# Patient Record
Sex: FEMALE | Race: BLACK OR AFRICAN AMERICAN | Employment: UNEMPLOYED | URBAN - METROPOLITAN AREA
[De-identification: names, ages, dates, MRNs, and addresses within clinical notes are randomized per-mention and may not be internally consistent; named-entity substitution may affect disease eponyms.]

---

## 2017-01-05 ENCOUNTER — HOSPITAL ENCOUNTER (OUTPATIENT)
Dept: LAB | Age: 52
Discharge: HOME | End: 2017-01-05

## 2017-01-05 ENCOUNTER — OFFICE VISIT (OUTPATIENT)
Dept: NEUROLOGY | Age: 52
End: 2017-01-05

## 2017-01-05 VITALS
OXYGEN SATURATION: 99 % | BODY MASS INDEX: 38.89 KG/M2 | DIASTOLIC BLOOD PRESSURE: 96 MMHG | SYSTOLIC BLOOD PRESSURE: 147 MMHG | TEMPERATURE: 98.1 F | HEART RATE: 105 BPM | HEIGHT: 64 IN | WEIGHT: 227.8 LBS

## 2017-01-05 DIAGNOSIS — S06.9XAA VERTIGO DUE TO BRAIN INJURY: ICD-10-CM

## 2017-01-05 DIAGNOSIS — R20.9 DISTURBANCE OF SKIN SENSATION: ICD-10-CM

## 2017-01-05 DIAGNOSIS — R42 VERTIGO DUE TO BRAIN INJURY: ICD-10-CM

## 2017-01-05 DIAGNOSIS — R41.3 MEMORY DISTURBANCE: ICD-10-CM

## 2017-01-05 DIAGNOSIS — F07.81 POST CONCUSSION SYNDROME: ICD-10-CM

## 2017-01-05 DIAGNOSIS — F07.81 POST CONCUSSION SYNDROME: Primary | ICD-10-CM

## 2017-01-05 PROCEDURE — 99001 SPECIMEN HANDLING PT-LAB: CPT

## 2017-01-05 RX ORDER — TOPIRAMATE 25 MG/1
TABLET ORAL
Qty: 90 TAB | Refills: 1 | Status: SHIPPED | OUTPATIENT
Start: 2017-01-05 | End: 2017-01-24 | Stop reason: SDUPTHER
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
## 2017-01-05 NOTE — MR AVS SNAPSHOT
Visit Information Date & Time Provider Department Dept. Phone Encounter #  
 1/5/2017  2:00 PM Sherri Vivas MD [address] [phone] [id number] Follow-up Instructions Return in about 1 month (around 2/5/2017). Your Appointments 2/28/2017  3:00 PM  
Follow Up with Sherri Vivas MD  
[address] [place] [address]) Appt Note: 1 month fu MRI Brain, Labs, EEG  
 [address] [address] [phone]  
  
   
 [facility] [address] Upcoming Health Maintenance Date Due DTaP/Tdap/Td series (1 - Tdap) 9/21/1985 INFLUENZA AGE 9 TO ADULT 8/1/2016 FOBT Q 1 YEAR AGE 50-75 9/5/2016 PAP AKA CERVICAL CYTOLOGY 9/1/2018 BREAST CANCER SCRN MAMMOGRAM 9/13/2018 Allergies as of 1/5/2017  Review Complete On: 1/5/2017 By: Sherri Vivas MD  
  
 Severity Noted Reaction Type Reactions Adhesive Medium 03/23/2015   Topical Rash Pcn [Penicillins]  01/04/2011    Swelling Current Immunizations  Never Reviewed No immunizations on file. Not reviewed this visit You Were Diagnosed With   
  
 Codes Comments Post concussion syndrome    -  Primary ICD-10-CM: F07.81 ICD-9-CM: 310.2 Memory disturbance     ICD-10-CM: R41.3 ICD-9-CM: 780.93 [facility]     ICD-10-CM: S06.9X0A, R42 
ICD-9-CM: 854.00, 780.4 Disturbance of skin sensation     ICD-10-CM: R20.9 ICD-9-CM: 349. 0 Vitals BP Pulse Temp Height(growth percentile) Weight(growth percentile) SpO2  
 (!) 147/96 (!) 105 98.1 °F (36.7 °C) (Oral) 5' 4\" (1.626 m) 227 lb 12.8 oz (103.3 kg) 99% BMI OB Status Smoking Status 39.1 kg/m2 Hysterectomy Never Smoker BMI and BSA Data Body Mass Index Body Surface Area  
 39.1 kg/m 2 2.16 m 2 Preferred Pharmacy Pharmacy Name Phone [facility] [phone] Your Updated Medication List  
  
   
This list is accurate as of: 17  3:06 PM.  Always use your most recent med list.  
  
  
  
  
 cyclobenzaprine 10 mg tablet Commonly known as:  FLEXERIL Take 1 Tab by mouth three (3) times daily as needed. Muscle spasms HYDROcodone-acetaminophen 5-325 mg per tablet Commonly known as:  [address] Take 1 Tab by mouth once over twenty-four (24) hours. As needed for severe pain  
  
 ibuprofen 800 mg tablet Commonly known as:  MOTRIN Take 1 Tab by mouth three (3) times daily as needed. Pain, headache  
  
 multivitamin tablet Commonly known as:  ONE A DAY Take 1 Tab by mouth daily. topiramate 25 mg tablet Commonly known as:  TOPAMAX  
1 at bedtime for 7 days then 2 at bedtime for 7 days then 1 q am and 2 qhs  
  
 VITAMIN B-12 1,000 mcg tablet Generic drug:  cyanocobalamin Take 1,000 mcg by mouth daily. Prescriptions Sent to Pharmacy Refills  
 topiramate (TOPAMAX) 25 mg tablet 1 Si at bedtime for 7 days then 2 at bedtime for 7 days then 1 q am and 2 qhs  
 Class: Normal  
 Pharmacy: [address] [place] [address] #: [phone] Follow-up Instructions Return in about 1 month (around 2017). To-Do List   
 2017 Neurology:  EEG AWAKE AND ASLEEP   
  
 2017 Imaging:  MRI BRAIN WO CONT   
  
 2017 Lab:  TSH 3RD GENERATION   
  
 2017   Lab:  VITAMIN B12   
  
 01/10/2017 4:00 PM  
  Appointment with Beto Pope PT at [facility] PT [address]  ([phone])  
  
 2017 4:30 PM  
  Appointment with Beto Pope PT at [facility] PT [address]  ([phone])  
  
 2017 3:30 PM  
  Appointment with Beto Pope PT at [facility] PT [address]  ([phone])  
  
 2017 3:30 PM  
 Appointment with Betsy Nava, PT at [facility] [address] ([phone])  
  
 01/30/2017 3:30 PM  
  Appointment with Betsy Nava, PT at [facility] [address] ([phone])  
  
 02/02/2017 3:30 PM  
  Appointment with Betsy Nava, PT at [facility] [address] ([phone]) Patient Instructions   
patitent to take vit c 500 mg twice a day if increased tingling with topamaax Introducing [facility]! Dear Olivia Morse: Thank you for requesting a [employer] account. Our records indicate that you already have an active [employer] account. You can access your account anytime at [email or web address] Did you know that you can access your hospital and ER discharge instructions at any time in [employer]? You can also review all of your test results from your hospital stay or ER visit. Additional Information If you have questions, please visit the Frequently Asked Questions section of the [employer] website at [email or web address] Remember, [employer] is NOT to be used for urgent needs. For medical emergencies, dial 911. Now available from your iPhone and Android! Please provide this summary of care documentation to your next provider. Your primary care clinician is listed as Jae Park. If you have any questions after today's visit, please call [phone].

## 2017-01-06 ENCOUNTER — OFFICE VISIT (OUTPATIENT)
Dept: FAMILY MEDICINE CLINIC | Age: 52
End: 2017-01-06

## 2017-01-06 VITALS
WEIGHT: 225.4 LBS | BODY MASS INDEX: 38.48 KG/M2 | DIASTOLIC BLOOD PRESSURE: 96 MMHG | SYSTOLIC BLOOD PRESSURE: 133 MMHG | OXYGEN SATURATION: 96 % | HEIGHT: 64 IN | RESPIRATION RATE: 16 BRPM | TEMPERATURE: 96.9 F | HEART RATE: 86 BPM

## 2017-01-06 DIAGNOSIS — R42 VERTIGO DUE TO BRAIN INJURY: ICD-10-CM

## 2017-01-06 DIAGNOSIS — S06.9XAA VERTIGO DUE TO BRAIN INJURY: ICD-10-CM

## 2017-01-06 DIAGNOSIS — F07.81 POST CONCUSSION SYNDROME: Primary | ICD-10-CM

## 2017-01-06 DIAGNOSIS — R53.1 WEAKNESS GENERALIZED: ICD-10-CM

## 2017-01-06 DIAGNOSIS — M54.41 ACUTE MIDLINE LOW BACK PAIN WITH BILATERAL SCIATICA: ICD-10-CM

## 2017-01-06 DIAGNOSIS — R51.9 DAILY HEADACHE: ICD-10-CM

## 2017-01-06 DIAGNOSIS — M54.42 ACUTE MIDLINE LOW BACK PAIN WITH BILATERAL SCIATICA: ICD-10-CM

## 2017-01-06 DIAGNOSIS — G89.29 CHRONIC PAIN OF RIGHT KNEE: ICD-10-CM

## 2017-01-06 DIAGNOSIS — M54.2 NECK PAIN: ICD-10-CM

## 2017-01-06 DIAGNOSIS — M25.561 CHRONIC PAIN OF RIGHT KNEE: ICD-10-CM
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
## 2017-01-06 NOTE — PROGRESS NOTES
Nunu Steinberg is a 45 y.o. female presents to office for hand[employer]p parking pass. 1. Have you been to the ER, urgent care clinic or hospitalized since your last visit? no  2. Have you seen any other providers outside of [facility] since your last visit? bobo  3.  Have you had a Flu shot this year? no       Health Maintenance items with a due date reviewed with patient:  Health Maintenance Due   Topic Date Due    DTaP/Tdap/Td series (1 - Tdap) 09/21/1985    INFLUENZA AGE 9 TO ADULT  08/01/2016    FOBT Q 1 YEAR AGE 50-75  09/05/2016

## 2017-01-06 NOTE — MR AVS SNAPSHOT
Visit Information Date & Time Provider Department Dept. Phone Encounter #  
 1/6/2017  8:00 AM Renetta Pham, [address] [facility] [phone] [id number] Follow-up Instructions Return if symptoms worsen or fail to improve. Your Appointments 2/28/2017  3:00 PM  
Follow Up with Santhosh Seals MD  
[address]) Appt Note: 1 month fu MRI Brain, Labs, EEG  
 [address] [place] [id number] [phone]  
  
   
 [facility] [address] Upcoming Health Maintenance Date Due DTaP/Tdap/Td series (1 - Tdap) 9/21/1985 INFLUENZA AGE 9 TO ADULT 8/1/2016 FOBT Q 1 YEAR AGE 50-75 9/5/2016 PAP AKA CERVICAL CYTOLOGY 9/1/2018 BREAST CANCER SCRN MAMMOGRAM 9/13/2018 Allergies as of 1/6/2017  Review Complete On: 1/6/2017 By: EWELINA Gonzalez Severity Noted Reaction Type Reactions Adhesive Medium 03/23/2015   Topical Rash Pcn [Penicillins]  01/04/2011    Swelling Current Immunizations  Never Reviewed No immunizations on file. Not reviewed this visit You Were Diagnosed With   
  
 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 
ICD-9-CM: 854.00, 780.4 Chronic pain of right knee     ICD-10-CM: M25.561, G89.29 ICD-9-CM: 719.46, 338.29 Acute midline low back pain with bilateral sciatica     ICD-10-CM: M54.42, M54.41 
ICD-9-CM: 724.2, 724.3 Neck pain     ICD-10-CM: M54.2 ICD-9-CM: 723.1 Weakness generalized     ICD-10-CM: R53.1 ICD-9-CM: 780.79 Daily headache     ICD-10-CM: R51 ICD-9-CM: 317. 0 Vitals BP Pulse Temp Resp Height(growth percentile) Weight(growth percentile)  (!) 133/96 (BP 1 Location: Right arm, BP Patient Position: Sitting) 86 96.9 °F (36.1 °C) (Oral) 16 5' 4\" (1.626 m) 225 lb 6.4 oz (102.2 kg) SpO2 BMI OB Status Smoking Status 96% 38.69 kg/m2 Hysterectomy Never Smoker BMI and BSA Data Body Mass Index Body Surface Area  
 38.69 kg/m 2 2.15 m 2 Preferred Pharmacy Pharmacy Name Phone [facility] [phone] Your Updated Medication List  
  
   
This list is accurate as of: 1/6/17  8:57 AM.  Always use your most recent med list.  
  
  
  
  
 cyclobenzaprine 10 mg tablet Commonly known as:  FLEXERIL Take 1 Tab by mouth three (3) times daily as needed. Muscle spasms HYDROcodone-acetaminophen 5-325 mg per tablet Commonly known as:  Viva Judge Take 1 Tab by mouth once over twenty-four (24) hours. As needed for severe pain  
  
 ibuprofen 800 mg tablet Commonly known as:  MOTRIN Take 1 Tab by mouth three (3) times daily as needed. Pain, headache  
  
 multivitamin tablet Commonly known as:  ONE A DAY Take 1 Tab by mouth daily. topiramate 25 mg tablet Commonly known as:  TOPAMAX  
1 at bedtime for 7 days then 2 at bedtime for 7 days then 1 q am and 2 qhs  
  
 VITAMIN B-12 1,000 mcg tablet Generic drug:  cyanocobalamin Take 1,000 mcg by mouth daily. Domo Cheadle Pt needs adult folding walker. M25.561, S06.9x0A, F07.81, R53.1 Prescriptions Printed Refills Walker misc 0 Sig: Pt needs adult folding walker. M25.561, S06.9x0A, F07.81, R53.1 Class: Print Follow-up Instructions Return if symptoms worsen or fail to improve.   
  
To-Do List   
 01/10/2017 4:00 PM  
  Appointment with Ferny Dykes PT at [facility] PT [address] [facility] [place]  ([phone])  
  
 01/12/2017 4:30 PM  
  Appointment with Ferny Dykes PT at [facility] PT [address] [facility] [place]  ([phone])  
  
 01/24/2017 3:30 PM  
  Appointment with Ferny Dykes PT at [facility] PT [address]  ([phone])  
  
 01/26/2017 3:30 PM  
 Appointment with Shant Steele, PT at [facility] PT [address] ([phone])  
  
 01/30/2017 3:30 PM  
  Appointment with Shant Steele PT at [facility] PT [address]  ([phone])  
  
 02/02/2017 3:30 PM  
  Appointment with Shant Steele PT at [facility] PT [address]  ([phone]) Patient Instructions Weakness: Care Instructions Your Care Instructions Weakness is a lack of physical or muscle strength. You may feel that you need to make extra effort to move your arms, legs, or other muscles. Generalized weakness means that you feel weak in most areas of your body. Another type of weakness may affect just one muscle or group of muscles. You may feel weak and tired after you have done too much activity, such as taking an extra-long hike. This is not a serious problem. It often goes away on its own. Feeling weak can also be caused by medical conditions like thyroid problems, depression, or a virus. Sometimes the cause can be serious. Your doctor may want to do more tests to try to find the cause of the weakness. The doctor has checked you carefully, but problems can develop later. If you notice any problems or new symptoms, get medical treatment right away. Follow-up care is a key part of your treatment and safety. Be sure to make and go to all appointments, and call your doctor if you are having problems. It's also a good idea to know your test results and keep a list of the medicines you take. How can you care for yourself at home? · Rest when you feel tired. · Be safe with medicines. If your doctor prescribed medicine, take it exactly as prescribed. Call your doctor if you think you are having a problem with your medicine. You will get more details on the specific medicines your doctor prescribes. · Do not skip meals. Eating a balanced diet may increase your energy level. · Get some physical activity every day, but do not get too tired. When should you call for help? Call your doctor now or seek immediate medical care if: 
· You have new or worse weakness. · You are dizzy or lightheaded, or you feel like you may faint. Watch closely for changes in your health, and be sure to contact your doctor if: 
· You do not get better as expected. Where can you learn more? Go to http://nestor-shaun.info/. Enter [id number] in the search box to learn more about \"Weakness: Care Instructions. \" Current as of: May 27, 2016 Content Version: 11.1 © 7780-7671 [employer]. Care instructions adapted under license by [employer] (which disclaims liability or warranty for this information). If you have questions about a medical condition or this instruction, always ask your healthcare professional. [address] any warranty or liability for your use of this information. Introducing \[facility]\"" & HEALTH SERVICES! Dear Juan Carlos Pérez: Thank you for requesting a ecoInsight account. Our records indicate that you already have an active ecoInsight account. You can access your account anytime at [email or web address]. [employer] Did you know that you can access your hospital and ER discharge instructions at any time in ecoInsight? You can also review all of your test results from your hospital stay or ER visit. Additional Information If you have questions, please visit the Frequently Asked Questions section of the ecoInsight website at [email or web address]. [employer]/. Remember, ecoInsight is NOT to be used for urgent needs. For medical emergencies, dial 911. Now available from your iPhone and Android! Please provide this summary of care documentation to your next provider. Your primary care clinician is listed as Nataliya Trujillo. If you have any questions after today's visit, please call [phone].

## 2017-01-06 NOTE — PATIENT INSTRUCTIONS
Weakness: Care Instructions  Your Care Instructions  Weakness is a lack of physical or muscle strength. You may feel that you need to make extra effort to move your arms, legs, or other muscles. Generalized weakness means that you feel weak in most areas of your body. Another type of weakness may affect just one muscle or group of muscles. You may feel weak and tired after you have done too much activity, such as taking an extra-long hike. This is not a serious problem. It often goes away on its own. Feeling weak can also be caused by medical conditions like thyroid problems, depression, or a virus. Sometimes the cause can be serious. Your doctor may want to do more tests to try to find the cause of the weakness. The doctor has checked you carefully, but problems can develop later. If you notice any problems or new symptoms, get medical treatment right away. Follow-up care is a key part of your treatment and safety. Be sure to make and go to all appointments, and call your doctor if you are having problems. It's also a good idea to know your test results and keep a list of the medicines you take. How can you care for yourself at home? · Rest when you feel tired. · Be safe with medicines. If your doctor prescribed medicine, take it exactly as prescribed. Call your doctor if you think you are having a problem with your medicine. You will get more details on the specific medicines your doctor prescribes. · Do not skip meals. Eating a balanced diet may increase your energy level. · Get some physical activity every day, but do not get too tired. When should you call for help? Call your doctor now or seek immediate medical care if:  · You have new or worse weakness. · You are dizzy or lightheaded, or you feel like you may faint. Watch closely for changes in your health, and be sure to contact your doctor if:  · You do not get better as expected. Where can you learn more?   Go to http://fox.info/. Enter [id number] in the search box to learn more about \"Weakness: Care Instructions. \"  Current as of: May 27, 2016  Content Version: 11.1  © 6983-5277 [employer]. Care instructions adapted under license by [employer] (which disclaims liability or warranty for this information). If you have questions about a medical condition or this instruction, always ask your healthcare professional. [address] any warranty or liability for your use of this information.

## 2017-01-08 LAB
TSH SERPL DL<=0.005 MIU/L-ACNC: 1.63 UIU/ML (ref 0.45–4.5)
VIT B12 SERPL-MCNC: 1642 PG/ML (ref 211–946)

## 2017-01-09 RX ORDER — HYDROCODONE BITARTRATE AND ACETAMINOPHEN 5; 325 MG/1; MG/1
1 TABLET ORAL
Qty: 14 TAB | Refills: 0 | OUTPATIENT
Start: 2017-01-09

## 2017-01-09 NOTE — TELEPHONE ENCOUNTER
Please call Deanne Suero, and inform her that she should discuss her pain control with her neurologist, since we transitioned her care from me to neurology, she no longer has follow-up appointments with me at this time. In addition, it is in her best interest to make every attempt to use other pain control methods (the flexeril, ibuprofen, application of heat and ice, gentle stretching) and not the narcotic medication, since it is highly addictive, and can certainly worsen her headache complaintnts. Thanks!

## 2017-01-09 NOTE — TELEPHONE ENCOUNTER
Pt states she needs a refill on her pain med. She states she thought she had a refill. Please call   [phone] to confirm sent.

## 2017-01-10 ENCOUNTER — HOSPITAL ENCOUNTER (OUTPATIENT)
Dept: PHYSICAL THERAPY | Age: 52
Discharge: HOME | End: 2017-01-10
Payer: MEDICAID

## 2017-01-10 PROCEDURE — 97110 THERAPEUTIC EXERCISES: CPT | Performed by: PHYSICAL THERAPIST

## 2017-01-10 NOTE — PROGRESS NOTES
[facility] PHYSICAL [facility]ERAPY - DAILY TREATMENT NOTE    Patient Name: Adore Dillard        Date: 1/10/2017  : 1965   yes Patient  Verified  Visit #:   3   of   12  Insurance: Payor: [address] / Plan: [facility] / Product Type: Managed Care Medicaid /      In time: 4:08 Out time: 4:52   Total Treatment Time: 42     Medicare Time Tracking (below)   Total Timed Codes (min):  na 1:1 Treatment Time:  na     TREATMENT AREA =  Head concussion [S06.0X9A]    SUBJECTIVE  Pain Level (on 0 to 10 scale):  6  / 10   Medication Changes/New allergies or changes in medical history, any new surgeries or procedures? yes  If yes, update Summary List   Subjective Functional Status/Changes:  []  No changes reported     i went and saw the neurologist and they scheduled me for a mri and eeg on the . My have been getting better since using the topamax.  But i still get really nauseous and dizzy if i do anything for too long - about 5 minutes of cooking and i will have to sit down         OBJECTIVE  Modalities Rationale:     decrease inflammation, decrease pain and increase tissue extensibility to improve patient's ability to turn head   min [] Estim, type/location:                                      []  att     []  unatt     []  w/US     []  w/ice    []  w/heat    min []  Mechanical Traction: type/lbs                   []  pro   []  sup   []  int   []  cont    []  before manual    []  after manual    min []  Ultrasound, settings/location:      min []  Iontophoresis w/ dexamethasone, location:                                               []  take home patch       []  in clinic   10 min []  Ice     [x]  Heat    location/position: Semi-recline with bolster    min []  Vasopneumatic Device, press/temp:     min []  Other:    [x] Skin assessment post-treatment (if applicable):    [x]  intact    []  redness- no adverse reaction     []redness  adverse reaction:        32 min Therapeutic Exercise:  [x]  See flow sheet   Rationale:      increase ROM and increase strength to improve the patients ability to turn head/look up     H min Manual Therapy: Mfr c/s paraspinals, ut, ls, scm, scalenes, temporalis   Rationale:      decrease pain, increase ROM and increase tissue extensibility to improve patient's ability to turn head look up         min Patient Education:  yes  Reviewed HEP   []  Progressed/Changed HEP based on: Other Objective/Functional Measures: All sessions performed behind closed curtain with lights off, completed te as per flow sheet requiring frequent breaks due to c/o of dizziness - UT stretch reproduced \"R sided throbbing\" that last approximately 2' then subsided to baseline, at 22 minutes in pt placed in semi-reclined position due to c/o of lbp which she continued to report as \"waking me up 2-3x/night.' advised pt unable to assess until cleared by MD and pt stated \"i will now be under the care of my neurologist and will no longer be seeing dr Tressa Oconnell"   Added in [address] and pt with decreased L horizontal by 75% compared to R      Post Treatment Pain Level (on 0 to 10) scale:   8  / 10     ASSESSMENT  Assessment/Changes in Function:     Pt still continues to have very limited participation in adls due to cognitive sx - unable to perform any activity >5 without sx.    []  See Progress Note/Recertification   Patient will continue to benefit from skilled PT services to modify and progress therapeutic interventions, address functional mobility deficits, address ROM deficits, address strength deficits, analyze and address soft tissue restrictions, analyze and cue movement patterns, analyze and modify body mechanics/ergonomics, assess and modify postural abnormalities, address imbalance/dizziness and instruct in home and community integration to attain remaining goals.    Progress toward goals / Updated goals:    Slight improvement with HA but this is most likely related to change in meds, pt did report compliance with hep. Given horizontal vor and explained to son.  Pt also stated was going to obtain own RW and return clinic when med emporium opened     PLAN  []  Upgrade activities as tolerated yes Continue plan of care   []  Discharge due to :    []  Other:      Therapist: Claribel Mejia PT    Date: 1/10/2017 Time: 6:47 PM     Future Appointments  Date Time Provider Diana Daigle   1/12/2017 4:30 PM Claribel Mejia PT [facility]   1/17/2017 9:00 AM [facility] MRI RM 1 [facility]   1/17/2017 10:00 AM [facility] EP/EEG RM [facility]   1/24/2017 3:30 PM Claribel Mejia PT [facility]   1/26/2017 3:30 PM Claribel Mejia PT [facility]   1/30/2017 3:30 PM Claribel Mejia PT [facility]   2/2/2017 3:30 PM Claribel Mejia PT [facility]   2/28/2017 3:00 PM Camila Howe MD [address] [place] [address]

## 2017-01-12 ENCOUNTER — HOSPITAL ENCOUNTER (OUTPATIENT)
Dept: PHYSICAL THERAPY | Age: 52
Discharge: HOME | End: 2017-01-12
Payer: MEDICAID

## 2017-01-12 PROCEDURE — 97110 THERAPEUTIC EXERCISES: CPT | Performed by: PHYSICAL THERAPIST

## 2017-01-12 PROCEDURE — 97140 MANUAL THERAPY 1/> REGIONS: CPT | Performed by: PHYSICAL THERAPIST

## 2017-01-12 NOTE — PROGRESS NOTES
[facility]  PHYSICAL THERAPY  [address], [place], [address] [facility] - Phone: [phone]  Fax: [phone]  PROGRESS NOTE  Patient Name: Mendel Ream : 1965   Treatment/Medical Diagnosis: Head concussion [S06.0X9A]   Referral Source: Sheridan Liu MD     Date of Initial Visit: 16 Attended Visits: 4 Missed Visits: 0     SUMMARY OF TREATMENT  Pt was seen for IE with three f/u visits following post-concussion protocol with treatment including therapeutic exercises for cervical rom, vestibular exercises for tracking, manual therapy (stm/prom/mobs) and modalities prn. In addition pt was instructed and given RW (pt returned last session as purchased own), activity modification (coginitive rest) and hep. Son was educated on the above as well  CURRENT STATUS  Pt has made slow progress with PT. She continues to have significant tenderness throughout SOR  C/s paraspinals, scalenes and scm. Tolerates very minimal manual therapy. Continues to c/o of nausea, dizziness, photophobia and HA. Pt eports \"pushing myself by walking as much as i can, not using my sunglasses, and going out to places like [place] and shopping and i get dizzy i just try to push through it. \" pt was again advised that she should be able to recover within minutes and be back to baseline by next morning. Demo verbal understanding. Continues to c/o of lbp and \"that is the most painful thing out of everything\" - advised pt would need a prescription from MD to continue. Goal/Measure of Progress Goal Met? 1. Pt will be compliant with hep   Status at last Eval: na Current Status:  yes   2. Pt will increase c/s arom rotation to >/=45 degrees to A with adls   Status at last Eval: R 8, L 12 Current Status: R 22, L 30 progressing   3.   Pt will note 25% reduction of HA in order to increase participation in adls   Status at last Eval: na Current Status: No change no RECOMMENDATIONS  WOULD LIKE TO EVALUATE AND TREAT Pt FOR LOW BACK PAIN WITH CONTINUED VISITS OF 2X/3 WEEKS  If you have any questions/comments please contact us directly at [phone]. Thank you for allowing us to assist in the care of your patient. Therapist Signature: Mracela Vale PT Date: 1/12/2017     Time: 6:10 PM   NOTE TO PHYSICIAN:  PLEASE COMPLETE THE ORDERS BELOW AND FAX TO   [facility] Physical Therapy: [phone]  If you are unable to process this request in 24 hours please contact our office: [phone]    ___ I have read the above report and request that my patient continue as recommended.   ___ I have read the above report and request that my patient continue therapy with the following changes/special instructions:_________________________________________________________   ___ I have read the above report and request that my patient be discharged from therapy.      Physician Signature:        Date:       Time:

## 2017-01-12 NOTE — PROGRESS NOTES
[facility] PH[facility] - DAILY TREATMENT NOTE    Patient Name: Dre Dunn        Date: 2017  : 1965   yes Patient  Verified  Visit #:     Insurance: Payor: [address] / [facility] [place] / Product Type: Managed Care Medicaid /      In time: 4:40 Out time: 5:25   Total Treatment Time: 45     Medicare Time Tracking (below)   Total Timed Codes (min):  na 1:1 Treatment Time:  na     TREATMENT AREA =  Head concussion [S06.0X9A]    SUBJECTIVE  Pain Level (on 0 to 10 scale):  6  / 10   Medication Changes/New allergies or changes in medical history, any new surgeries or procedures? yes  If yes, update Summary List   Subjective Functional Status/Changes:  []  No changes reported     i went and saw the neurologist and they scheduled me for a mri and eeg on the . My have been getting better since using the topamax.  But i still get really nauseous and dizzy if i do anything for too long - about 5 minutes of cooking and i will have to sit down         OBJECTIVE  Modalities Rationale:     decrease inflammation, decrease pain and increase tissue extensibility to improve patient's ability to turn head   min [] Estim, type/location:                                      []  att     []  unatt     []  w/US     []  w/ice    []  w/heat    min []  Mechanical Traction: type/lbs                   []  pro   []  sup   []  int   []  cont    []  before manual    []  after manual    min []  Ultrasound, settings/location:      min []  Iontophoresis w/ dexamethasone, location:                                               []  take home patch       []  in clinic   10 min []  Ice     [x]  Heat    location/position: Semi-recline with bolster    min []  Vasopneumatic Device, press/temp:     min []  Other:    [x] Skin assessment post-treatment (if applicable):    [x]  intact    []  redness- no adverse reaction     []redness  adverse reaction:        15 min Therapeutic Exercise:  [x]  See flow sheet   Rationale:      increase ROM and increase strength to improve the patients ability to turn head/look up     15 min Manual Therapy: Mfr c/s paraspinals, ut, ls, scm, scalenes, temporalis, B shoulder girdle depression   Rationale:      decrease pain, increase ROM and increase tissue extensibility to improve patient's ability to turn head look up         min Patient Education:  yes  Reviewed HEP   []  Progressed/Changed HEP based on: Other Objective/Functional Measures:  See pn       Post Treatment Pain Level (on 0 to 10) scale:   7  / 10     ASSESSMENT  Assessment/Changes in Function:     Pt was seen by two rehab techs driving into parking lot to therapy session with son in car assisting with RW from back seat, she was also seen leaving the parking lot. Pt also reports \"pushing myself by walking as much as i can, not using my sunglasses, and going out to places like [place] and shopping and i get dizzy i just try to push through it. \" pt was again advised that she should be able to recover within minutes and be back to baseline by next morning. Demo verbal understanding     []  See Progress Note/Recertification   Patient will continue to benefit from skilled PT services to modify and progress therapeutic interventions, address functional mobility deficits, address ROM deficits, address strength deficits, analyze and address soft tissue restrictions, analyze and cue movement patterns, analyze and modify body mechanics/ergonomics, assess and modify postural abnormalities, address imbalance/dizziness and instruct in home and community integration to attain remaining goals. Progress toward goals / Updated goals:    Continues to c/o of lbp and \"that is the most painful thing out of everything\" - advised pt would need a prescription from MD to continue.  She stated that dr. Zulema Son would \"no longer be in charge on my case and it is only the neurologist\"     PLAN  []  Upgrade activities as tolerated yes Continue plan of care   []  Discharge due to :    []  Other:      Therapist: Madelaine Solares PT    Date: 1/12/2017 Time: 6:47 PM     Future Appointments  Date Time Provider Diana Dagile   1/17/2017 9:00 AM [facility] MRI RM 1 [facility]   1/17/2017 10:00 AM [facility] EP/EEG RM [facility]   1/24/2017 3:30 PM Madelaine Solares PT [facility]   1/26/2017 3:30 PM Madelaine Solares PT [facility]   1/30/2017 3:30 PM Madelaine Solares PT [facility]   2/2/2017 3:30 PM Madelaine Solares PT [facility]   2/28/2017 3:00 PM Key Mcfarlane MD [address] [place] [address]

## 2017-01-17 ENCOUNTER — DOCUMENTATION ONLY (OUTPATIENT)
Dept: NEUROLOGY | Age: 52
End: 2017-01-17

## 2017-01-17 ENCOUNTER — HOSPITAL ENCOUNTER (OUTPATIENT)
Dept: MRI IMAGING | Age: 52
Discharge: HOME | End: 2017-01-17
Attending: PSYCHIATRY & NEUROLOGY
Payer: MEDICAID

## 2017-01-17 ENCOUNTER — HOSPITAL ENCOUNTER (OUTPATIENT)
Dept: NEUROLOGY | Age: 52
Discharge: HOME | End: 2017-01-17
Attending: PSYCHIATRY & NEUROLOGY
Payer: MEDICAID

## 2017-01-17 DIAGNOSIS — R41.3 MEMORY DISTURBANCE: ICD-10-CM

## 2017-01-17 DIAGNOSIS — R42 VERTIGO DUE TO BRAIN INJURY: ICD-10-CM

## 2017-01-17 DIAGNOSIS — F07.81 POST CONCUSSION SYNDROME: ICD-10-CM

## 2017-01-17 DIAGNOSIS — S06.9XAA VERTIGO DUE TO BRAIN INJURY: ICD-10-CM

## 2017-01-17 DIAGNOSIS — R20.9 DISTURBANCE OF SKIN SENSATION: ICD-10-CM

## 2017-01-17 PROCEDURE — 70551 MRI BRAIN STEM W/O DYE: CPT

## 2017-01-17 PROCEDURE — 95819 EEG AWAKE AND ASLEEP: CPT

## 2017-01-17 NOTE — PROGRESS NOTES
Maru Leon with [facility] called and stated that he wanted to make Dr. Queenie Pineda aware he was unable to do the EEG, patient wanted to reschedule to a time when her hair would be easier to manage for the technician. Dr. Queenie Pineda being made aware.

## 2017-01-24 ENCOUNTER — HOSPITAL ENCOUNTER (OUTPATIENT)
Dept: PHYSICAL THERAPY | Age: 52
Discharge: HOME | End: 2017-01-24
Payer: MEDICAID

## 2017-01-24 ENCOUNTER — OFFICE VISIT (OUTPATIENT)
Dept: NEUROLOGY | Age: 52
End: 2017-01-24

## 2017-01-24 ENCOUNTER — TELEPHONE (OUTPATIENT)
Dept: NEUROLOGY | Age: 52
End: 2017-01-24

## 2017-01-24 VITALS
TEMPERATURE: 97.5 F | DIASTOLIC BLOOD PRESSURE: 90 MMHG | HEIGHT: 64 IN | OXYGEN SATURATION: 99 % | BODY MASS INDEX: 39.47 KG/M2 | SYSTOLIC BLOOD PRESSURE: 147 MMHG | WEIGHT: 231.2 LBS | HEART RATE: 89 BPM

## 2017-01-24 DIAGNOSIS — M25.561 CHRONIC PAIN OF RIGHT KNEE: ICD-10-CM

## 2017-01-24 DIAGNOSIS — R41.3 MEMORY DISTURBANCE: ICD-10-CM

## 2017-01-24 DIAGNOSIS — F07.81 POST CONCUSSION SYNDROME: Primary | ICD-10-CM

## 2017-01-24 DIAGNOSIS — R53.1 WEAKNESS GENERALIZED: ICD-10-CM

## 2017-01-24 DIAGNOSIS — S06.9XAA VERTIGO DUE TO BRAIN INJURY: ICD-10-CM

## 2017-01-24 DIAGNOSIS — R42 VERTIGO DUE TO BRAIN INJURY: ICD-10-CM

## 2017-01-24 DIAGNOSIS — G89.29 CHRONIC PAIN OF RIGHT KNEE: ICD-10-CM

## 2017-01-24 PROCEDURE — 97140 MANUAL THERAPY 1/> REGIONS: CPT | Performed by: PHYSICAL THERAPIST

## 2017-01-24 PROCEDURE — 97110 THERAPEUTIC EXERCISES: CPT | Performed by: PHYSICAL THERAPIST

## 2017-01-24 RX ORDER — TOPIRAMATE 50 MG/1
TABLET, FILM COATED ORAL
Qty: 60 TAB | Refills: 1 | Status: SHIPPED | OUTPATIENT
Start: 2017-01-24 | End: 2017-02-15

## 2017-01-24 RX ORDER — IBUPROFEN 800 MG/1
800 TABLET ORAL
Qty: 60 TAB | Refills: 0 | Status: SHIPPED | OUTPATIENT
Start: 2017-01-24 | End: 2017-03-08 | Stop reason: SDUPTHER

## 2017-01-24 NOTE — TELEPHONE ENCOUNTER
Can not escript a walker per pharmacy. Script must be given to patient and taken to DME. Dr. Willy Lewis Please dc escript and write out for patient. Dr. Willy Lewis made aware.
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
## 2017-01-24 NOTE — MR AVS SNAPSHOT
Visit Information Date & Time Provider Department Dept. Phone Encounter #  
 1/24/2017 10:45 AM Ezequiel Early MD [address] [place] [address] [address] [phone] [id number] Upcoming Health Maintenance Date Due DTaP/Tdap/Td series (1 - Tdap) 9/21/1985 INFLUENZA AGE 9 TO ADULT 8/1/2016 FOBT Q 1 YEAR AGE 50-75 9/5/2016 PAP AKA CERVICAL CYTOLOGY 9/1/2018 BREAST CANCER SCRN MAMMOGRAM 9/13/2018 Allergies as of 1/24/2017  Review Complete On: 1/24/2017 By: Elly Fiore LPN Severity Noted Reaction Type Reactions Adhesive Medium 03/23/2015   Topical Rash Pcn [Penicillins]  01/04/2011    Swelling Current Immunizations  Never Reviewed No immunizations on file. Not reviewed this visit You Were Diagnosed With   
  
 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 
ICD-9-CM: 854.00, 780.4 Chronic pain of right knee     ICD-10-CM: M25.561, G89.29 ICD-9-CM: 719.46, 338.29 Weakness generalized     ICD-10-CM: R53.1 ICD-9-CM: 780.79 Vitals BP Pulse Temp Height(growth percentile) Weight(growth percentile) SpO2  
 147/90 89 97.5 °F (36.4 °C) (Oral) 5' 4\" (1.626 m) 231 lb 3.2 oz (104.9 kg) 99% BMI OB Status Smoking Status 39.69 kg/m2 Hysterectomy Never Smoker BMI and BSA Data Body Mass Index Body Surface Area  
 39.69 kg/m 2 2.18 m 2 Preferred Pharmacy Pharmacy Name Phone [facility] [address] [phone] Your Updated Medication List  
  
   
This list is accurate as of: 1/24/17 12:13 PM.  Always use your most recent med list.  
  
  
  
  
 cyclobenzaprine 10 mg tablet Commonly known as:  FLEXERIL Take 1 Tab by mouth three (3) times daily as needed. Muscle spasms HYDROcodone-acetaminophen 5-325 mg per tablet Commonly known as:  Aden Songster Take 1 Tab by mouth once over twenty-four (24) hours. As needed for severe pain  
  
 ibuprofen 800 mg tablet Commonly known as:  MOTRIN Take 1 Tab by mouth three (3) times daily as needed. Pain, headache LORazepam 1 mg tablet Commonly known as:  ATIVAN  
1 tab po prior to test  
  
 multivitamin tablet Commonly known as:  ONE A DAY Take 1 Tab by mouth daily. topiramate 50 mg tablet Commonly known as:  TOPAMAX 1 twice a day VITAMIN B-12 1,000 mcg tablet Generic drug:  cyanocobalamin Take 1,000 mcg by mouth daily. Brennan Slider Pt needs adult folding walker. M25.561, S06.9x0A, F07.81, R53.1 Prescriptions Sent to Pharmacy Refills Walker misc 0 Sig: Pt needs adult folding walker. M25.561, S06.9x0A, F07.81, R53.1 Class: Normal  
 Pharmacy: [address] [place] [address] Ph #: [phone]  
 topiramate (TOPAMAX) 50 mg tablet 1 Si twice a day Class: Normal  
 Pharmacy: [address] [place] [address] Ph #: [phone]  
 ibuprofen (MOTRIN) 800 mg tablet 0 Sig: Take 1 Tab by mouth three (3) times daily as needed. Pain, headache Class: Normal  
 Pharmacy: [address] [place] [address] Ph #: [phone] Route: Oral  
  
To-Do List   
 2017 3:30 PM  
  Appointment with Riky Vasquez PT at [facility] [address] ([phone])  
  
 2017 3:30 PM  
  Appointment with Riky Vasquez PT at [facility] [address] [facility] ([phone])  
  
 2017 3:30 PM  
  Appointment with Riky Vasquez PT at [facility] [address] ([phone])  
  
 2017 3:30 PM  
  Appointment with Riky Vasquez PT at [facility] [address] ([phone]) [facility]! Dear Mohan Beam: Thank you for requesting a [employer] account. Our records indicate that you already have an active [employer] account. You can access your account anytime at [email or web address] Did you know that you can access your hospital and ER discharge instructions at any time in [employer]de[employer]? You can also review all of your test results from your hospital stay or ER visit. Additional Information If you have questions, please visit the Frequently Asked Questions section of the [employer] website at [email or web address] Remember, [employer] is NOT to be used for urgent needs. For medical emergencies, dial 911. Now available from your iPhone and Android! Please provide this summary of care documentation to your next provider. Your primary care clinician is listed as Marco Antonio Henson. If you have any questions after today's visit, please call [phone].

## 2017-01-24 NOTE — PROGRESS NOTES
[facility]   PHYSICAL THERAPY - DAILY TREATMENT NOTE    Patient Name: Ruthann Cheema        Date: 2017  : 1965   yes Patient  Verified  Visit #:   5   of   12  Insurance: Payor: [address] / Plan: [address] [facility] / Product Type: Managed Care Medicaid /      In time: 3:40 Out time: 4:25   Total Treatment Time: 39     Medicare Time Tracking (below)   Total Timed Codes (min):  na 1:1 Treatment Time:  na     TREATMENT AREA =  Head concussion [S06.0X9A]    SUBJECTIVE  Pain Level (on 0 to 10 scale):  7  / 10   Medication Changes/New allergies or changes in medical history, any new surgeries or procedures?    no  If yes, update Summary List   Subjective Functional Status/Changes:  []  No changes reported     See pn          OBJECTIVE  Modalities Rationale:     decrease pain and increase tissue extensibility to improve patient's ability to turn head/look up   min [] Estim, type/location:                                      []  att     []  unatt     []  w/US     []  w/ice    []  w/heat    min []  Mechanical Traction: type/lbs                   []  pro   []  sup   []  int   []  cont    []  before manual    []  after manual    min []  Ultrasound, settings/location:      min []  Iontophoresis w/ dexamethasone, location:                                               []  take home patch       []  in clinic   10 min []  Ice     [x]  Heat    location/position: Long sit    min []  Vasopneumatic Device, press/temp:     min []  Other:    [x] Skin assessment post-treatment (if applicable):    [x]  intact    []  redness- no adverse reaction     []redness  adverse reaction:        15 min Therapeutic Exercise:  [x]  See flow sheet   Rationale:      increase ROM and increase strength to improve the patients ability to turn head/look up     15 min Manual Therapy: stm c/s paraspinals, ut, ls, scm , tx, sor   Rationale:      decrease pain, increase tissue extensibility and decrease trigger points to improve patient's ability to turn head/look up     min Patient Education:  yes  Reviewed HEP   []  Progressed/Changed HEP based on: Other Objective/Functional Measures:    See pn     Post Treatment Pain Level (on 0 to 10) scale:   6  / 10     ASSESSMENT  Assessment/Changes in Function:     See pn     []  See Progress Note/Recertification   Patient will continue to benefit from skilled PT services to modify and progress therapeutic interventions, address functional mobility deficits, address ROM deficits, address strength deficits, analyze and address soft tissue restrictions, analyze and cue movement patterns, analyze and modify body mechanics/ergonomics, assess and modify postural abnormalities, address imbalance/dizziness and instruct in home and community integration to attain remaining goals.    Progress toward goals / Updated goals:         PLAN  []  Upgrade activities as tolerated yes Continue plan of care   []  Discharge due to :    []  Other:      Therapist: Amari Caceres PT    Date: 1/24/2017 Time: 5:28 PM     Future Appointments  Date Time Provider Diana Daigle   1/26/2017 3:30 PM Amari Caceres PT [facility]   1/30/2017 3:30 PM Amari Caceres PT [facility]   2/2/2017 3:30 PM Amari Caceres, PT [facility]

## 2017-01-26 ENCOUNTER — APPOINTMENT (OUTPATIENT)
Dept: PHYSICAL THERAPY | Age: 52
End: 2017-01-26
Payer: MEDICAID

## 2017-01-30 ENCOUNTER — APPOINTMENT (OUTPATIENT)
Dept: PHYSICAL THERAPY | Age: 52
End: 2017-01-30
Payer: MEDICAID

## 2017-02-02 ENCOUNTER — APPOINTMENT (OUTPATIENT)
Dept: PHYSICAL THERAPY | Age: 52
End: 2017-02-02

## 2017-02-03 NOTE — PROGRESS NOTES
..[address] PHYSICAL THERAPY  [address], [place], [address] - Phone: [phone]  Fax: [phone]  DISCHARGE SUMMARY  Patient Name: Benita Jacobs : 1965   Treatment/Medical Diagnosis: Head concussion [S06.0X9A]   Referral Source: Anna Baez MD     Date of Initial Visit: 16 Attended Visits: 5 Missed Visits: 0     SUMMARY OF TREATMENT  Pt was seen for IE and 4 f/u visits with treatment consisting therapeutic exercises for cervical rom and vestibular retraining, manual therapy (prom/mobs/stm) and modalities prn. In addition pt was instructed on hep and activity modification  CURRENT STATUS  Pt called on 17 to state she is moving out of town and would like to self discharge. Progress towards goals not assessed. RECOMMENDATIONS  Other: pt self discharged    If you have any questions/comments please contact us directly at [phone]. Thank you for allowing us to assist in the care of your patient.     Therapist Signature: Kelley Isaacs PT Date: 2/3/17     Time: 1:03 PM

## 2017-02-15 ENCOUNTER — OFFICE VISIT (OUTPATIENT)
Dept: FAMILY MEDICINE CLINIC | Age: 52
End: 2017-02-15

## 2017-02-15 ENCOUNTER — HOSPITAL ENCOUNTER (OUTPATIENT)
Dept: LAB | Age: 52
Discharge: HOME | End: 2017-02-15

## 2017-02-15 VITALS
RESPIRATION RATE: 17 BRPM | DIASTOLIC BLOOD PRESSURE: 100 MMHG | BODY MASS INDEX: 38.82 KG/M2 | SYSTOLIC BLOOD PRESSURE: 138 MMHG | OXYGEN SATURATION: 98 % | HEIGHT: 64 IN | TEMPERATURE: 96.4 F | HEART RATE: 88 BPM | WEIGHT: 227.4 LBS

## 2017-02-15 DIAGNOSIS — G89.29 CHRONIC PAIN OF RIGHT KNEE: Primary | ICD-10-CM

## 2017-02-15 DIAGNOSIS — M25.561 CHRONIC PAIN OF RIGHT KNEE: Primary | ICD-10-CM

## 2017-02-15 DIAGNOSIS — S06.9XAA VERTIGO DUE TO BRAIN INJURY: ICD-10-CM

## 2017-02-15 DIAGNOSIS — F07.81 POST CONCUSSION SYNDROME: ICD-10-CM

## 2017-02-15 DIAGNOSIS — R42 VERTIGO DUE TO BRAIN INJURY: ICD-10-CM

## 2017-02-15 PROCEDURE — 99001 SPECIMEN HANDLING PT-LAB: CPT | Performed by: PSYCHIATRY & NEUROLOGY

## 2017-02-15 RX ORDER — CANE TIPS
EACH MISCELLANEOUS
Qty: 1 DEVICE | Refills: 0 | Status: SHIPPED | OUTPATIENT
Start: 2017-02-15

## 2017-02-15 NOTE — PROGRESS NOTES
Emanuel Alicea is a 45 y.o. female presents to office for request for a cane. 1. Have you been to the ER, urgent care clinic or hospitalized since your last visit? no  2. Have you seen any other providers outside of [facility] since your last visit? no  3.  Have you had a Flu shot this year? no       Health Maintenance items with a due date reviewed with patient:  Health Maintenance Due   Topic Date Due    DTaP/Tdap/Td series (1 - Tdap) 09/21/1985    INFLUENZA AGE 9 TO ADULT  08/01/2016    FOBT Q 1 YEAR AGE 50-75  09/05/2016

## 2017-02-15 NOTE — MR AVS SNAPSHOT
Visit Information Date & Time Provider Department Dept. Phone Encounter #  
 2/15/2017 11:45 AM Graciela Galan, [address] [facility] [phone] [id number] Follow-up Instructions Return if symptoms worsen or fail to improve. Upcoming Health Maintenance Date Due DTaP/Tdap/Td series (1 - Tdap) 9/21/1985 INFLUENZA AGE 9 TO ADULT 8/1/2016 FOBT Q 1 YEAR AGE 50-75 9/5/2016 PAP AKA CERVICAL CYTOLOGY 9/1/2018 BREAST CANCER SCRN MAMMOGRAM 9/13/2018 Allergies as of 2/15/2017  Review Complete On: 2/15/2017 By: EWELINA Giraldo Severity Noted Reaction Type Reactions Adhesive Medium 03/23/2015   Topical Rash Pcn [Penicillins]  01/04/2011    Swelling Current Immunizations  Never Reviewed No immunizations on file. Not reviewed this visit You Were Diagnosed With   
  
 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 
ICD-9-CM: 854.00, 780.4 Post concussion syndrome     ICD-10-CM: F07.81 ICD-9-CM: 310.2 Vitals BP Pulse Temp Resp Height(growth percentile) Weight(growth percentile) (!) 138/100 (BP 1 Location: Left arm, BP Patient Position: Sitting) 88 96.4 °F (35.8 °C) (Oral) 17 5' 4\" (1.626 m) 227 lb 6.4 oz (103.1 kg) SpO2 BMI OB Status Smoking Status 98% 39.03 kg/m2 Hysterectomy Never Smoker BMI and BSA Data Body Mass Index Body Surface Area 39.03 kg/m 2 2.16 m 2 Preferred Pharmacy [facility]acy Name Phone [facility] [address] [phone] Your Updated Medication List  
  
   
This list is accurate as of: 2/15/17 12:04 PM.  Always use your most recent med list.  
  
  
  
  
 Duc Said  
1 cane for ambulation M25.561 Chronic knee pain  S06. 9x0A Vertigo  
  
 cyclobenzaprine 10 mg tablet Commonly known as:  FLEXERIL Take 1 Tab by mouth three (3) times daily as needed. Muscle spasms HYDROcodone-acetaminophen 5-325 mg per tablet Commonly known as:  Olvin Ill Take 1 Tab by mouth once over twenty-four (24) hours. As needed for severe pain  
  
 ibuprofen 800 mg tablet Commonly known as:  MOTRIN Take 1 Tab by mouth three (3) times daily as needed. Pain, headache LORazepam 1 mg tablet Commonly known as:  ATIVAN  
1 tab po prior to test  
  
 multivitamin tablet Commonly known as:  ONE A DAY Take 1 Tab by mouth daily. topiramate 50 mg tablet Commonly known as:  TOPAMAX 1 twice a day VITAMIN B-12 1,000 mcg tablet Generic drug:  cyanocobalamin Take 1,000 mcg by mouth daily. Prescriptions Printed Refills Cane cruz 0 Si cane for ambulation M25.561 Chronic knee pain  S06. 9x0A Vertigo Class: Print Follow-up Instructions Return if symptoms worsen or fail to improve. Patient Instructions Learning About How to Use a [employer], [address] Your Care Instructions A cane can help you walk when you have an injured hip, leg, knee, or foot. You may also need a cane if you have a weak leg or problems with your balance. Canes are usually made of metal, wood, or plastic. There are two kinds of canes. A standard (single point) cane has a single rubber tip that does not slip as you lean on it. It usually has a curved (crook) handle, or it may have a T-handle that might be easier to grasp if your hand is weak. A quad cane has four rubber tips that touch the ground, and it is the most stable. You can get a quad cane with the rubber tips closer together or wider apart. The narrow size makes it easier for you to climb stairs. The length of the cane is important. Your elbow should have only a slight bend when you lean on the cane. To stay safe when using a cane: 
· Look straight ahead, not down at your feet. · Clear away small rugs, cords, or anything else that could cause you to trip, slip, or fall. · Be very careful around pets and small children. They can get in your path when you least expect it. · Be sure the tip of your cane is clean and in good condition to help prevent slipping. · Avoid slick conditions, such as wet floors and snowy or icy driveways. In bad weather, be especially careful on curbs and steps. How to use a cane Holding a cane Hold the cane in the hand opposite your weak or injured leg. So if your right leg is weak, hold the cane in your left hand. Walking with a cane 1. Set the cane about 4 inches to the side of your strong leg when you are standing still. 2. To take a step, put most of your weight on your strong leg. Move the cane several inches forward while moving your weak leg forward. 3. Put weight on your cane to limit the weight on your weak leg, and move your strong leg forward. Stand up straight as you do this. Do not let your body lean. 4. Move your cane about 4 inches in front of you, and start your next step. 5. Take small steps. 6. Use ramps and elevators when you can. Sitting with a cane 1. To sit, back up to the chair. Touch the back of your legs to the chair. 2. Set the cane aside where you can reach it and it won't fall over. 3. Support most of your weight on your strong leg, and reach back for the arms of the chair. 4. Slowly and carefully lower yourself into the chair. Getting up from a chair with a cane 1. To get up from a chair, first make sure your cane is close by so you can grab it when you stand up. 2. Grab the arms of the chair, and move your strong leg slightly forward. 3. Scoot forward in the chair until your rear end is near the front of the seat. 4. Lean forward so your head is above your strong foot. 5. With your weight mostly on your strong leg, hold both arms of the chair and push yourself up out of the chair. 6. Grab your cane and set it about 4 inches to the side of your strong leg. Going up stairs with a cane 1. To go up stairs, step up with your strong leg. 2. Bring the cane and your weak leg to the step. 3. Use a handrail if there is one. Going down stairs with a cane 1. To go down stairs, put your cane and weak leg on the lower step. 2. Bring your strong leg to the lower step. This saying may help you remember: \"Up with the good, down with the bad. \" 3. Try this first with another person nearby to steady you if needed. Follow-up care is a key part of your treatment and safety. Be sure to make and go to all appointments, and call your doctor if you are having problems. It's also a good idea to know your test results and keep a list of the medicines you take. Where can you learn more? Go to http://nestor-shaun.info/. Enter O933 in the search box to learn more about \"Learning About How to Use a Cane. \" Current as of: August 4, 2016 Content Version: 11.1 © 0114-6757 [employer], [employer]. Care instructions adapted under license by [employer] (which disclaims liability or warranty for this information). If you have questions about a medical condition or this instruction, always ask your healthcare professional. [place] [address] any warranty or liability for your use of this information. Introducing [facility] & HEALTH SERVICES! Dear Reena Wood: Thank you for requesting a WEEZEVENT account. Our records indicate that you already have an active WEEZEVENT account. You can access your account anytime at [email or web address] Did you know that you can access your hospital and ER discharge instructions at any time in WEEZEVENT? You can also review all of your test results from your hospital stay or ER visit. Additional Information If you have questions, please visit the Frequently Asked Questions section of the [employer] website at [email or web address] Remember, [employer] is NOT to be used for urgent needs. For medical emergencies, dial 911. Now available from your iPhone and Android! Please provide this summary of care documentation to your next provider. Your primary care clinician is listed as Gavino Spivey. If you have any questions after today's visit, please call [phone].

## 2017-02-15 NOTE — PATIENT INSTRUCTIONS
Learning About How to Use a [facility]    A cane can help you walk when you have an injured hip, leg, knee, or foot. You may also need a cane if you have a weak leg or problems with your balance. Canes are usually made of metal, wood, or plastic. There are two kinds of canes. A standard (single point) cane has a single rubber tip that does not slip as you lean on it. It usually has a curved (crook) handle, or it may have a T-handle that might be easier to grasp if your hand is weak. A quad cane has four rubber tips that touch the ground, and it is the most stable. You can get a quad cane with the rubber tips closer together or wider apart. The narrow size makes it easier for you to climb stairs. The length of the cane is important. Your elbow should have only a slight bend when you lean on the cane. To stay safe when using a cane:  · Look straight ahead, not down at your feet. · Clear away small rugs, cords, or anything else that could cause you to trip, slip, or fall. · Be very careful around pets and small children. They can get in your path when you least expect it. · Be sure the tip of your cane is clean and in good condition to help prevent slipping. · Avoid slick conditions, such as wet floors and snowy or icy driveways. In bad weather, be especially careful on curbs and steps. How to use a cane  Holding a cane    Hold the cane in the hand opposite your weak or injured leg. So if your right leg is weak, hold the cane in your left hand. Walking with a cane    1. Set the cane about 4 inches to the side of your strong leg when you are standing still. 2. To take a step, put most of your weight on your strong leg. Move the cane several inches forward while moving your weak leg forward. 3. Put weight on your cane to limit the weight on your weak leg, and move your strong leg forward. Stand up straight as you do this. Do not let your body lean.   4. Move your cane about 4 inches in front of you, and start your next step. 5. Take small steps. 6. Use ramps and elevators when you can. Sitting with a cane    1. To sit, back up to the chair. Touch the back of your legs to the chair. 2. Set the cane aside where you can reach it and it won't fall over. 3. Support most of your weight on your strong leg, and reach back for the arms of the chair. 4. Slowly and carefully lower yourself into the chair. Getting up from a chair with a cane    1. To get up from a chair, first make sure your cane is close by so you can grab it when you stand up. 2. Grab the arms of the chair, and move your strong leg slightly forward. 3. Scoot forward in the chair until your rear end is near the front of the seat. 4. Lean forward so your head is above your strong foot. 5. With your weight mostly on your strong leg, hold both arms of the chair and push yourself up out of the chair. 6. Grab your cane and set it about 4 inches to the side of your strong leg. Going up stairs with a cane    1. To go up stairs, step up with your strong leg. 2. Bring the cane and your weak leg to the step. 3. Use a handrail if there is one. Going down stairs with a cane    1. To go down stairs, put your cane and weak leg on the lower step. 2. Bring your strong leg to the lower step. This saying may help you remember: \"Up with the good, down with the bad. \"  3. Try this first with another person nearby to steady you if needed. Follow-up care is a key part of your treatment and safety. Be sure to make and go to all appointments, and call your doctor if you are having problems. It's also a good idea to know your test results and keep a list of the medicines you take. Where can you learn more? Go to http://nestor-shaun.info/. Enter T236 in the search box to learn more about \"Learning About How to Use a Cane. \"  Current as of: August 4, 2016  Content Version: 11.1  © 2421-3778 [employer], [facility].  [facility] instructions adapted under license by [employer] (which disclaims liability or warranty for this information). If you have questions about a medical condition or this instruction, always ask your healthcare professional. [address] any warranty or liability for your use of this information.

## 2017-02-15 NOTE — PROGRESS NOTES
HISTORY OF PRESENT ILLNESS  Peter Hinson is a 45 y.o. female. HPI Comments: Pt comes in today for f/u post concussion syndrome, vertigo, and chronic right knee pain. She says that she is a little better but she is still having pain and vertigo. She says that she is being followed very closely by Neurology. She says that she has been using a walker but she is doing better now and would rather have a cane instead and requests a prescription. She denies fever, chills, sweats, N/V, chest pain, SOB. Other   Associated symptoms include headaches. Pertinent negatives include no chest pain, no abdominal pain and no shortness of breath. Review of Systems   Constitutional: Negative for chills, diaphoresis, fever and malaise/fatigue. HENT: Negative for congestion, ear pain, hearing loss, nosebleeds and sore throat. Eyes: Negative for blurred vision, double vision, pain and redness. Respiratory: Negative for cough, hemoptysis, sputum production, shortness of breath and wheezing. Cardiovascular: Negative for chest pain, palpitations and leg swelling. Gastrointestinal: Negative for abdominal pain, blood in stool, constipation, diarrhea, nausea and vomiting. Genitourinary: Negative for dysuria and hematuria. Musculoskeletal: Positive for joint pain and neck pain. Negative for back pain and myalgias. Skin: Negative for rash. Neurological: Positive for dizziness and headaches. Negative for tingling, sensory change, seizures and loss of consciousness. Endo/Heme/Allergies: Does not bruise/bleed easily. Psychiatric/Behavioral: Negative for depression, memory loss and substance abuse. The patient is not nervous/anxious.       Past Medical History   Diagnosis Date    Allergy     Anemia 1/4/2011    AR (allergic rhinitis) 1/6/2011    Chronic pain      BILATERAL KNEEWS    Contact dermatitis and other eczema, due to unspecified cause      CONTACT SKIN    Depression     Left ovarian cyst 5/26/2016  Obesity     Pelvic fluid collection 2015    PNA (pneumonia)     Psychotic disorder      BIPOLAR    Urinary frequency     Urinary tract infection, site not specified     UTI (urinary tract infection)        Family History   Problem Relation Age of Onset    No Known Problems Mother     Liver Disease Father     Seizures Father     Heart Disease Maternal Grandmother     Cancer Maternal Grandfather      stomach    No Known Problems Paternal Grandmother     No Known Problems Paternal Grandfather     No Known Problems Brother     No Known Problems Brother     Diabetes Maternal Aunt     Kidney Disease Maternal Aunt     Psychotic Disorder Maternal Aunt        Past Surgical History   Procedure Laterality Date    Delivery   ,     Hx breast biopsy  , ,      both    Hx hysterectomy  2003    Hx knee arthroscopy  2006     right       Social History     Social History    Marital status:      Spouse name: N/A    Number of children: N/A    Years of education: N/A     Occupational History    Not on file. Social History Main Topics    Smoking status: Never Smoker    Smokeless tobacco: Never Used    Alcohol use No      Comment: QUIT     Drug use: No    Sexual activity: No     Other Topics Concern     Service No    Blood Transfusions No    Caffeine Concern No    Occupational Exposure No    Hobby Hazards No    Sleep Concern Yes     INSOMNIA    Stress Concern Yes     FAMILY; FINANCIAL    Weight Concern Yes     OVERWEIGHT    Special Diet No    Back Care No    Exercise No    Bike Helmet No    Seat Belt Yes    Self-Exams Yes     Social History Narrative       Current Outpatient Prescriptions   Medication Sig Dispense Refill    Cane cruz 1 cane for ambulation M25.561 Chronic knee pain  S06. 9x0A Vertigo 1 Device 0    ibuprofen (MOTRIN) 800 mg tablet Take 1 Tab by mouth three (3) times daily as needed.  Pain, headache 60 Tab 0    multivitamin (ONE A DAY) tablet Take 1 Tab by mouth daily.  cyanocobalamin (VITAMIN B-12) 1,000 mcg tablet Take 1,000 mcg by mouth daily.  cyclobenzaprine (FLEXERIL) 10 mg tablet Take 1 Tab by mouth three (3) times daily as needed. Muscle spasms 90 Tab 1       Allergies   Allergen Reactions    Adhesive Rash    Pcn [Penicillins] Swelling       Visit Vitals    BP (!) 138/100 (BP 1 Location: Left arm, BP Patient Position: Sitting)    Pulse 88    Temp 96.4 °F (35.8 °C) (Oral)    Resp 17    Ht 5' 4\" (1.626 m)    Wt 227 lb 6.4 oz (103.1 kg)    SpO2 98%    BMI 39.03 kg/m2       Physical Exam   Constitutional: She is oriented to person, place, and time. She appears well-developed and well-nourished. No distress. HENT:   Head: Normocephalic and atraumatic. Right Ear: External ear normal.   Left Ear: External ear normal.   Nose: Nose normal.   Mouth/Throat: Oropharynx is clear and moist. No oropharyngeal exudate. Eyes: Conjunctivae are normal. Pupils are equal, round, and reactive to light. Right eye exhibits no discharge. Left eye exhibits no discharge. Neck: Normal range of motion. Neck supple. No tracheal deviation present. No thyromegaly present. Cardiovascular: Normal rate, regular rhythm and normal heart sounds. Exam reveals no gallop and no friction rub. No murmur heard. Pulmonary/Chest: Effort normal and breath sounds normal. No respiratory distress. She has no wheezes. She has no rales. Abdominal: Soft. She exhibits no distension. There is no tenderness. Musculoskeletal: She exhibits tenderness. She exhibits no edema. Lymphadenopathy:     She has no cervical adenopathy. Neurological: She is alert and oriented to person, place, and time. Coordination normal.   Skin: Skin is warm and dry. No rash noted. She is not diaphoretic. No erythema. Psychiatric: She has a normal mood and affect. Her behavior is normal. Judgment and thought content normal.       ASSESSMENT and PLAN  1. Chronic pain of right knee  - Cane cruz; 1 cane for ambulation M25.561 Chronic knee pain  S06. 9x0A Vertigo  Dispense: 1 Device; Refill: 0    2. 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 9x0A Vertigo  Dispense: 1 Device; Refill: 0    3. Post concussion syndrome  - Cane cruz; 1 cane for ambulation M25.561 Chronic knee pain  S06. 9x0A Vertigo  Dispense: 1 Device; Refill: 0  - followed by Neuro    - follow up as needed    Plan and reference materials were reviewed with the patient and the patient expressed understanding. Patient instructed that if symptoms/condition worsens or fails to resolve to come back to the office or go to the emergency room.     Jaylin Smith

## 2017-02-16 LAB
TSH SERPL DL<=0.005 MIU/L-ACNC: 1.42 UIU/ML
VIT B12 SERPL-MCNC: >2000 PG/ML (ref 211–946)

## 2017-03-08 ENCOUNTER — OFFICE VISIT (OUTPATIENT)
Dept: NEUROLOGY | Age: 52
End: 2017-03-08

## 2017-03-08 VITALS
DIASTOLIC BLOOD PRESSURE: 92 MMHG | RESPIRATION RATE: 14 BRPM | WEIGHT: 237 LBS | BODY MASS INDEX: 40.46 KG/M2 | OXYGEN SATURATION: 98 % | SYSTOLIC BLOOD PRESSURE: 142 MMHG | HEIGHT: 64 IN | HEART RATE: 103 BPM | TEMPERATURE: 98.4 F

## 2017-03-08 DIAGNOSIS — S06.9XAA VERTIGO DUE TO BRAIN INJURY: ICD-10-CM

## 2017-03-08 DIAGNOSIS — R41.3 MEMORY DISTURBANCE: ICD-10-CM

## 2017-03-08 DIAGNOSIS — R42 VERTIGO DUE TO BRAIN INJURY: ICD-10-CM

## 2017-03-08 DIAGNOSIS — F07.81 POST CONCUSSION SYNDROME: Primary | ICD-10-CM

## 2017-03-08 RX ORDER — IBUPROFEN 800 MG/1
800 TABLET ORAL
Qty: 90 TAB | Refills: 0 | Status: SHIPPED | OUTPATIENT
Start: 2017-03-08 | End: 2019-01-21 | Stop reason: SDUPTHER

## 2017-03-08 NOTE — MR AVS SNAPSHOT
Visit Information Date & Time Provider Department Dept. Phone Encounter #  
 3/8/2017  8:15 AM Nani Cuello MD [address] [place] [address] [phone] [id number] Follow-up Instructions Return if symptoms worsen or fail to improve. Follow-up and Disposition History Upcoming Health Maintenance Date Due DTaP/Tdap/Td series (1 - Tdap) 9/21/1985 INFLUENZA AGE 9 TO ADULT 8/1/2016 FOBT Q 1 YEAR AGE 50-75 9/5/2016 PAP AKA CERVICAL CYTOLOGY 9/1/2018 BREAST CANCER SCRN MAMMOGRAM 9/13/2018 Allergies as of 3/8/2017  Review Complete On: 3/8/2017 By: Camron Monroy LPN Severity Noted Reaction Type Reactions Adhesive Medium 03/23/2015   Topical Rash Pcn [Penicillins]  01/04/2011    Swelling Current Immunizations  Never Reviewed No immunizations on file. Not reviewed this visit You Were Diagnosed With   
  
 Codes Comments Post concussion syndrome    -  Primary ICD-10-CM: F07.81 ICD-9-CM: 310.2 Memory disturbance     ICD-10-CM: R41.3 ICD-9-CM: 780.93 Vertigo due to brain injury ([facility] [address]     ICD-10-CM: S06.9X0A, R42 
ICD-9-CM: 854.00, 780.4 Vitals BP Pulse Temp Resp Height(growth percentile) Weight(growth percentile) (!) 142/92 (BP 1 Location: Left arm, BP Patient Position: Sitting) (!) 103 98.4 °F (36.9 °C) (Oral) 14 5' 4\" (1.626 m) 237 lb (107.5 kg) SpO2 BMI OB Status Smoking Status 98% 40.68 kg/m2 Hysterectomy Never Smoker BMI and BSA Data Body Mass Index Body Surface Area  
 40.68 kg/m 2 2.2 m 2 Preferred Pharmacy Pharmacy Name Phone [facility] [address] [phone] Your Updated Medication List  
  
   
This list is accurate as of: 3/8/17  9:07 AM.  Always use your most recent med list.  
  
  
  
  
 [facility]  
1 cane for ambulation M25.561 Chronic knee pain  S06. 9x0A Vertigo  
  
 cyclobenzaprine 10 mg tablet Commonly known as:  FLEXERIL Take 1 Tab by mouth three (3) times daily as needed. Muscle spasms  
  
 ibuprofen 800 mg tablet Commonly known as:  MOTRIN Take 1 Tab by mouth three (3) times daily as needed. Pain, headache  
  
 multivitamin tablet Commonly known as:  ONE A DAY Take 1 Tab by mouth daily. VITAMIN B-12 1,000 mcg tablet Generic drug:  cyanocobalamin Take 1,000 mcg by mouth daily. Prescriptions Sent to Pharmacy Refills  
 ibuprofen (MOTRIN) 800 mg tablet 0 Sig: Take 1 Tab by mouth three (3) times daily as needed. Pain, headache Class: Normal  
 Pharmacy: [address] [place] [address] #: [phone] Route: Oral  
  
Follow-up Instructions Return if symptoms worsen or fail to improve. Patient Instructions Dietary supplements including vit b2,magox and coenzyme q10 recommended Introducing [facility]! Dear Helen Velasco: Thank you for requesting a [employer] account. Our records indicate that you already have an active [employer] account. You can access your account anytime at [email or web address] Did you know that you can access your hospital and ER discharge instructions at any time in [employer]? You can also review all of your test results from your hospital stay or ER visit. Additional Information If you have questions, please visit the Frequently Asked Questions section of the [employer] website at [email or web address] Remember, [employer] is NOT to be used for urgent needs. For medical emergencies, dial 911. Now available from your iPhone and Android! Please provide this summary of care documentation to your next provider. Your primary care clinician is listed as Marco Antonio Henson. If you have any questions after today's visit, please call [phone].

## 2017-04-21 ENCOUNTER — TELEPHONE (OUTPATIENT)
Dept: FAMILY MEDICINE CLINIC | Age: 52
End: 2017-04-21

## 2017-11-21 DIAGNOSIS — F07.81 POST CONCUSSION SYNDROME: Primary | ICD-10-CM

## 2017-11-22 ENCOUNTER — TELEPHONE (OUTPATIENT)
Dept: NEUROLOGY | Age: 52
End: 2017-11-22

## 2017-11-22 NOTE — TELEPHONE ENCOUNTER
[facility] called about the referral.  They don't see for TBI. Should be referral to Physical Medicine. [phone].  They see for TBI and even do Neuropsych testing

## 2017-11-28 NOTE — TELEPHONE ENCOUNTER
Called Physical Medicine Clinic, spoke with Romeo Dailey, and obtained fax number. (P) [phone]  (F) [phone]    Referral faxed to office.

## 2019-01-21 ENCOUNTER — OFFICE VISIT (OUTPATIENT)
Dept: FAMILY MEDICINE CLINIC | Age: 54
End: 2019-01-21

## 2019-01-21 ENCOUNTER — HOSPITAL ENCOUNTER (OUTPATIENT)
Dept: LAB | Age: 54
Discharge: HOME | End: 2019-01-21
Payer: COMMERCIAL

## 2019-01-21 VITALS
SYSTOLIC BLOOD PRESSURE: 140 MMHG | DIASTOLIC BLOOD PRESSURE: 90 MMHG | RESPIRATION RATE: 17 BRPM | HEIGHT: 64 IN | TEMPERATURE: 98.3 F | HEART RATE: 80 BPM | BODY MASS INDEX: 41.48 KG/M2 | OXYGEN SATURATION: 98 % | WEIGHT: 243 LBS

## 2019-01-21 DIAGNOSIS — G89.29 CHRONIC PAIN OF BOTH EARS: ICD-10-CM

## 2019-01-21 DIAGNOSIS — R42 CHRONIC VERTIGO: ICD-10-CM

## 2019-01-21 DIAGNOSIS — H92.03 CHRONIC PAIN OF BOTH EARS: ICD-10-CM

## 2019-01-21 DIAGNOSIS — R60.0 LOWER LEG EDEMA: ICD-10-CM

## 2019-01-21 DIAGNOSIS — M25.561 CHRONIC PAIN OF RIGHT KNEE: ICD-10-CM

## 2019-01-21 DIAGNOSIS — H91.93 DECREASED HEARING OF BOTH EARS: ICD-10-CM

## 2019-01-21 DIAGNOSIS — R73.03 PREDIABETES: ICD-10-CM

## 2019-01-21 DIAGNOSIS — D50.9 IRON DEFICIENCY ANEMIA, UNSPECIFIED IRON DEFICIENCY ANEMIA TYPE: ICD-10-CM

## 2019-01-21 DIAGNOSIS — F31.9 BIPOLAR AFFECTIVE DISORDER, REMISSION STATUS UNSPECIFIED (HCC): ICD-10-CM

## 2019-01-21 DIAGNOSIS — E66.01 OBESITY, MORBID (HCC): ICD-10-CM

## 2019-01-21 DIAGNOSIS — R42 VERTIGO DUE TO BRAIN INJURY: ICD-10-CM

## 2019-01-21 DIAGNOSIS — Z12.39 SCREENING FOR BREAST CANCER: ICD-10-CM

## 2019-01-21 DIAGNOSIS — J30.9 ALLERGIC RHINITIS, UNSPECIFIED SEASONALITY, UNSPECIFIED TRIGGER: ICD-10-CM

## 2019-01-21 DIAGNOSIS — G89.29 CHRONIC PAIN OF RIGHT KNEE: ICD-10-CM

## 2019-01-21 DIAGNOSIS — S06.9XAA VERTIGO DUE TO BRAIN INJURY: ICD-10-CM

## 2019-01-21 DIAGNOSIS — R73.03 PREDIABETES: Primary | ICD-10-CM

## 2019-01-21 DIAGNOSIS — F07.81 POST CONCUSSION SYNDROME: ICD-10-CM

## 2019-01-21 LAB
ALBUMIN SERPL-MCNC: 3.7 G/DL (ref 3.4–5)
ALBUMIN/GLOB SERPL: 1 {RATIO} (ref 0.8–1.7)
ALP SERPL-CCNC: 130 U/L (ref 45–117)
ALT SERPL-CCNC: 10 U/L (ref 13–56)
ANION GAP SERPL CALC-SCNC: 3 MMOL/L (ref 3–18)
AST SERPL-CCNC: 14 U/L (ref 15–37)
BASOPHILS # BLD: 0 K/UL (ref 0–0.1)
BASOPHILS NFR BLD: 0 % (ref 0–2)
BILIRUB SERPL-MCNC: 0.5 MG/DL (ref 0.2–1)
BUN SERPL-MCNC: 11 MG/DL (ref 7–18)
BUN/CREAT SERPL: 14 (ref 12–20)
CALCIUM SERPL-MCNC: 8.5 MG/DL (ref 8.5–10.1)
CHLORIDE SERPL-SCNC: 108 MMOL/L (ref 100–108)
CO2 SERPL-SCNC: 29 MMOL/L (ref 21–32)
CREAT SERPL-MCNC: 0.77 MG/DL (ref 0.6–1.3)
DIFFERENTIAL METHOD BLD: NORMAL
EOSINOPHIL # BLD: 0 K/UL (ref 0–0.4)
EOSINOPHIL NFR BLD: 1 % (ref 0–5)
ERYTHROCYTE [DISTWIDTH] IN BLOOD BY AUTOMATED COUNT: 13.6 % (ref 11.6–14.5)
GLOBULIN SER CALC-MCNC: 3.8 G/DL (ref 2–4)
GLUCOSE SERPL-MCNC: 96 MG/DL (ref 74–99)
HBA1C MFR BLD: 5.4 % (ref 4.2–5.6)
HCT VFR BLD AUTO: 43.9 % (ref 35–45)
HGB BLD-MCNC: 14.5 G/DL (ref 12–16)
LYMPHOCYTES # BLD: 1.8 K/UL (ref 0.9–3.6)
LYMPHOCYTES NFR BLD: 32 % (ref 21–52)
MCH RBC QN AUTO: 30.2 PG (ref 24–34)
MCHC RBC AUTO-ENTMCNC: 33 G/DL (ref 31–37)
MCV RBC AUTO: 91.5 FL (ref 74–97)
MONOCYTES # BLD: 0.4 K/UL (ref 0.05–1.2)
MONOCYTES NFR BLD: 8 % (ref 3–10)
NEUTS SEG # BLD: 3.2 K/UL (ref 1.8–8)
NEUTS SEG NFR BLD: 59 % (ref 40–73)
PLATELET # BLD AUTO: 318 K/UL (ref 135–420)
PMV BLD AUTO: 9.7 FL (ref 9.2–11.8)
POTASSIUM SERPL-SCNC: 3.9 MMOL/L (ref 3.5–5.5)
PROT SERPL-MCNC: 7.5 G/DL (ref 6.4–8.2)
RBC # BLD AUTO: 4.8 M/UL (ref 4.2–5.3)
SODIUM SERPL-SCNC: 140 MMOL/L (ref 136–145)
WBC # BLD AUTO: 5.5 K/UL (ref 4.6–13.2)

## 2019-01-21 PROCEDURE — 36415 COLL VENOUS BLD VENIPUNCTURE: CPT

## 2019-01-21 PROCEDURE — 83036 HEMOGLOBIN GLYCOSYLATED A1C: CPT

## 2019-01-21 PROCEDURE — 80053 COMPREHEN METABOLIC PANEL: CPT

## 2019-01-21 PROCEDURE — 85025 COMPLETE CBC W/AUTO DIFF WBC: CPT

## 2019-01-21 RX ORDER — IBUPROFEN 800 MG/1
800 TABLET ORAL
Qty: 90 TAB | Refills: 0 | Status: SHIPPED | OUTPATIENT
Start: 2019-01-21

## 2019-01-21 RX ORDER — MINERAL OIL
ENEMA (ML) RECTAL
COMMUNITY
End: 2019-01-21 | Stop reason: SDUPTHER

## 2019-01-21 RX ORDER — FLUTICASONE PROPIONATE 50 MCG
2 SPRAY, SUSPENSION (ML) NASAL DAILY
COMMUNITY
End: 2019-01-21 | Stop reason: SDUPTHER

## 2019-01-21 RX ORDER — FUROSEMIDE 20 MG/1
20 TABLET ORAL DAILY
Qty: 90 TAB | Refills: 1 | Status: SHIPPED | OUTPATIENT
Start: 2019-01-21

## 2019-01-21 RX ORDER — FLUTICASONE PROPIONATE 50 MCG
2 SPRAY, SUSPENSION (ML) NASAL DAILY
Qty: 1 BOTTLE | Refills: 2 | Status: SHIPPED | OUTPATIENT
Start: 2019-01-21

## 2019-01-21 RX ORDER — MINERAL OIL
180 ENEMA (ML) RECTAL DAILY
Qty: 90 TAB | Refills: 1 | Status: SHIPPED | OUTPATIENT
Start: 2019-01-21

## 2019-01-21 RX ORDER — FUROSEMIDE 20 MG/1
TABLET ORAL DAILY
COMMUNITY
End: 2019-01-21 | Stop reason: SDUPTHER

## 2019-01-21 NOTE — PROGRESS NOTES
Sabino Martinez is a 46 y.o. female presents to office for allergies and establish care. Referral to ENT for ear pain, worse on right side.     Medication list has been reviewed with Sabino Martinez and updated as of today's date     Health Maintenance items with a due date reviewed with patient:  Health Maintenance Due   Topic Date Due    DTaP/Tdap/Td series (1 - Tdap) 09/21/1985    Shingrix Vaccine Age 50> (1 of 2) 09/21/2014    FOBT Q 1 YEAR AGE 50-75  09/05/2016    Influenza Age 9 to Adult  08/01/2018    PAP AKA CERVICAL CYTOLOGY  09/01/2018    BREAST CANCER SCRN MAMMOGRAM  09/13/2018    MEDICARE YEARLY EXAM  01/17/2019
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
## 2019-01-24 NOTE — PROGRESS NOTES
Patient's blood work is within normal limit her A1c has improved in the last 2 years from 5.7-5.4 now it is not prediabetes anymore is normal. 
 
All other results are within normal limit.  
 
 
Elevated alkaline phosphatase that comes from the bones, she need to take natural calcium, if not taking any natural calcium she take calcium supplement and also take vitamin D over-the-counter 1000 unit daily

## 2019-01-28 ENCOUNTER — HOSPITAL ENCOUNTER (OUTPATIENT)
Dept: MAMMOGRAPHY | Age: 54
Discharge: HOME | End: 2019-01-28
Attending: LEGAL MEDICINE
Payer: COMMERCIAL

## 2019-01-28 DIAGNOSIS — Z12.39 SCREENING FOR BREAST CANCER: ICD-10-CM

## 2019-01-28 PROCEDURE — 77063 BREAST TOMOSYNTHESIS BI: CPT
